# Patient Record
Sex: FEMALE | URBAN - NONMETROPOLITAN AREA
[De-identification: names, ages, dates, MRNs, and addresses within clinical notes are randomized per-mention and may not be internally consistent; named-entity substitution may affect disease eponyms.]

---

## 2024-08-07 ENCOUNTER — OFFICE VISIT (OUTPATIENT)
Dept: PODIATRY | Age: 24
End: 2024-08-07
Payer: COMMERCIAL

## 2024-08-07 VITALS
WEIGHT: 254.8 LBS | HEART RATE: 76 BPM | DIASTOLIC BLOOD PRESSURE: 76 MMHG | SYSTOLIC BLOOD PRESSURE: 126 MMHG | RESPIRATION RATE: 20 BRPM

## 2024-08-07 DIAGNOSIS — M20.60 DEFORMITY, TOE ACQUIRED, UNSPECIFIED LATERALITY: Primary | ICD-10-CM

## 2024-08-07 DIAGNOSIS — M79.675 TOE PAIN, BILATERAL: ICD-10-CM

## 2024-08-07 DIAGNOSIS — M79.674 TOE PAIN, BILATERAL: ICD-10-CM

## 2024-08-07 DIAGNOSIS — M19.079 INFLAMMATION OF FOOT JOINT: ICD-10-CM

## 2024-08-07 PROCEDURE — 99204 OFFICE O/P NEW MOD 45 MIN: CPT | Performed by: PODIATRIST

## 2024-08-07 RX ORDER — NAPROXEN 500 MG/1
500 TABLET ORAL
COMMUNITY
Start: 2023-11-07

## 2024-08-07 RX ORDER — METHYLPREDNISOLONE 4 MG/1
TABLET ORAL
Qty: 1 KIT | Refills: 0 | Status: SHIPPED | OUTPATIENT
Start: 2024-08-07

## 2024-08-07 RX ORDER — CITALOPRAM HYDROBROMIDE 10 MG/1
10 TABLET ORAL NIGHTLY
COMMUNITY
Start: 2024-07-25

## 2024-08-07 RX ORDER — MOMETASONE FUROATE AND FORMOTEROL FUMARATE DIHYDRATE 200; 5 UG/1; UG/1
AEROSOL RESPIRATORY (INHALATION)
COMMUNITY
Start: 2024-07-18

## 2024-08-07 RX ORDER — ALBUTEROL SULFATE 90 UG/1
AEROSOL, METERED RESPIRATORY (INHALATION)
COMMUNITY
Start: 2024-07-18

## 2024-08-07 NOTE — PROGRESS NOTES
Subjective:  Angelique Hutson is a 23 y.o. female who presents to the office today complaining of a toe  deformity Bilateral 2.  Symptoms began year(s) ago.  L side got painful.   Patient relates pain is Present.  Pain is rated 4 out of 10 and is described as waxing and waning, moderate.  Worse after activity.  Treatments prior to today's visit include: xrays.  Currently denies F/C/N/V.     No Known Allergies    Past Medical History:   Diagnosis Date    Anxiety     Asthma        Prior to Admission medications    Medication Sig Start Date End Date Taking? Authorizing Provider   albuterol sulfate HFA (PROVENTIL;VENTOLIN;PROAIR) 108 (90 Base) MCG/ACT inhaler INHALE ONE TO TWO PUFFS BY MOUTH EVERY 4 TO 6 HOURS AS NEEDED WHEEZING OR SHORTNESS OF BREATH 7/18/24  Yes Provider, MD Devi   citalopram (CELEXA) 10 MG tablet Take 1 tablet by mouth nightly 7/25/24  Yes Provider, MD Devi   DULERA 200-5 MCG/ACT inhaler INHALE TWO PUFFS BY MOUTH TWICE DAILY EVERY DAY 7/18/24  Yes Provider, MD Devi   naproxen (NAPROSYN) 500 MG tablet Take 1 tablet by mouth 11/7/23  Yes Provider, MD Devi   Drospirenone 4 MG TABS Take 4 mg by mouth daily 1/4/24 1/3/25 Yes Provider, MD Devi   methylPREDNISolone (MEDROL DOSEPACK) 4 MG tablet Take by mouth. 8/7/24  Yes Dax Prajapati DPM       Past Surgical History:   Procedure Laterality Date    HYMENECTOMY  2021       History reviewed. No pertinent family history.    Social History     Tobacco Use    Smoking status: Never    Smokeless tobacco: Never   Substance Use Topics    Alcohol use: Never       ROS: All 14 ROS systems reviewed and pertinent positives noted above, all others negative.    Lower Extremity Physical Examination:     Vitals:   Vitals:    08/07/24 0826   BP: 126/76   Pulse: 76   Resp: 20     General: AAO x 3 in NAD.     Vascular: DP and PT pulses palpable 2/4, bilateral.  CFT <3 seconds, bilateral.  Hair growth present to the level of the digits,

## 2024-08-07 NOTE — PATIENT INSTRUCTIONS
You may receive a survey that will ask about your recent patient visit experience.   We value your feedback, so we kindly request that you take a few moments to complete our survey. Your valuable insights will help us ensure that we provide top-notch care, and A+ quality to you and your loved ones.     We strive for 5's!    [facility]